# Patient Record
Sex: MALE | Race: WHITE | Employment: FULL TIME | URBAN - METROPOLITAN AREA
[De-identification: names, ages, dates, MRNs, and addresses within clinical notes are randomized per-mention and may not be internally consistent; named-entity substitution may affect disease eponyms.]

---

## 2019-12-10 ENCOUNTER — HOSPITAL ENCOUNTER (OUTPATIENT)
Dept: CT IMAGING | Age: 54
Discharge: HOME | End: 2019-12-10
Attending: INTERNAL MEDICINE

## 2019-12-10 DIAGNOSIS — Z13.9 ENCOUNTER FOR SCREENING: ICD-10-CM

## 2019-12-10 NOTE — PROGRESS NOTES
Pt seen at [facility], [facility] for 80 [address] AHEXC=168  RB=375/74    Cholestec labs as follows:declined  TC=  HDL=  LDL=  TG=  GLUCOSE=    Pt to be scheduled for free PV screening    All results and risk factors discussed with patient; all questions and

## 2020-01-31 ENCOUNTER — HOSPITAL ENCOUNTER (OUTPATIENT)
Dept: ULTRASOUND IMAGING | Age: 55
Discharge: HOME | End: 2020-01-31
Attending: INTERNAL MEDICINE

## 2020-01-31 DIAGNOSIS — Z13.9 ENCOUNTER FOR SCREENING: ICD-10-CM

## 2021-03-26 ENCOUNTER — IMMUNIZATION (OUTPATIENT)
Dept: LAB | Age: 56
End: 2021-03-26

## 2021-03-26 DIAGNOSIS — Z23 NEED FOR VACCINATION: Primary | ICD-10-CM

## 2021-03-26 PROCEDURE — 91300 COVID 19 PFIZER-BIONTECH: CPT

## 2021-03-26 PROCEDURE — 0001A COVID 19 PFIZER-BIONTECH: CPT

## 2021-04-16 ENCOUNTER — IMMUNIZATION (OUTPATIENT)
Dept: LAB | Age: 56
End: 2021-04-16
Attending: HOSPITALIST

## 2021-04-16 DIAGNOSIS — Z23 NEED FOR VACCINATION: Primary | ICD-10-CM

## 2021-04-16 PROCEDURE — 91300 COVID 19 PFIZER-BIONTECH: CPT

## 2021-04-16 PROCEDURE — 0002A COVID 19 PFIZER-BIONTECH: CPT
